# Patient Record
(demographics unavailable — no encounter records)

---

## 2017-09-28 NOTE — XMS
Clinical Summary

 Created on:2017



Patient:Obie Tomas

Sex:Male

:1936

External Reference #:[id number]





Demographics







 Address  [address] [place] [address]

 

 Home Phone  [phone]

 

 Work Phone  [phone]

 

 Preferred Language  Unknown

 

 Marital Status  

 

 Religion Affiliation  Unknown

 

 Race  White

 

 Ethnic Group  Not  or 









Author







 Organization  [facility]

 

 Address  [address] [place] [address]

 

 Phone  [phone]









Care Team Providers







 Name  Role  Phone

 

 ,  Primary Care Provider  Unavailable









Allergies

Not on File



Current Medications

Not on file



Active Problems

Not on file



Social History







 Tobacco Use  Types  Packs/Day  Years Used  Date

 

 Never Assessed        









 Sex Assigned at Birth  Date Recorded

 

 Not on file  







Last Filed Vital Signs

Not on file



Plan of Treatment

Not on file



Results

Not on filefrom Last 3 Months

## 2017-09-29 NOTE — PRG
DATE OF SERVICE:  09/29/2017

 

SUBJECTIVE:  Mr. Tomas feels better.

 

OBJECTIVE:

VITAL SIGNS:  He is afebrile, heart rate in the 70s, respiratory rate 12, oximetry is 95 on 2 liters
, blood pressure 119/63.  Intake and output is negative 1370.

LUNGS:  Improved.

CARDIOVASCULAR:  Heart regular rhythm.

ABDOMEN:  Soft.

 

IMPRESSION:

1.  Congestive heart failure.  Dr. Calle is assisting in his management.

2.  Underlying obstructive lung disease, it is stable.

3.  Status post bilobectomy on the right for lung cancer in the past.

4.  Deconditioning after a fall with a pelvis fracture.

 

PLAN:  Continue current care.

## 2017-09-29 NOTE — HP
DATE OF ADMISSION:  09/28/2017

 

HISTORY OF PRESENT ILLNESS:  Obie Tomas is a very pleasant gentleman who moved here at the end of 
2016.  I saw him in November when he came to me seeking a pulmonary physician.

 

He has a history of sleep apnea and old records were reviewed suggested that he is marginally compli
ant with CPAP.  He has a COPD.

 

He has had a right upper lobe and right middle lobectomy for an apical nodule that was malignant yea
rs ago.  He has a history of a lipid disorder, reflux disease, hypertension, coronary artery disease
, and BPH.

 

He recently fell and had multiple fractures including a pelvis fracture and required hospitilization
, rehab and then hospitalization with pneumonia and then again rehab.  He has only recently been vasyl
e.  He saw Dr. Calle recently, who adjusted medications.  He has a history of atrial fibrillation f
or which he takes Lopressor and Cardizem.

 

His heart rate lately Mr. Tomas says it has been running in the 1-teens at home using his oximeter
 to count his pulse.

 

He presented today to my office with complaints of shortness of breath and hypoxemia at home.

 

Subsequently, he has been admitted because of pulmonary edema on his radiograph and for rate control
.

 

PAST MEDICAL HISTORY:  Otherwise mentioned only above, he has had 2 pneumonias and cervical spine pr
oblems prior to moving up here.

 

He is a 3-pack a day smoker for many years.

 

It is my impression that since he already had two lobes resected, he must not have had terrible COPD
 on pulmonary function testing.

 

MEDICATIONS:  Prior to admission by his report the same as discharged from the hospital, which inclu
ded Eliquis, nebulizer, Flomax, Protonix, MiraLax, Singulair, Lopressor, Cardizem, lisinopril, and D
emadex.

 

FAMILY HISTORY:  Negative for lung disease in early age.

 

SOCIAL HISTORY:  He is formerly self employed.  If I recall correctly, he was involved in the oil an
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

 

REVIEW OF SYSTEMS:  Otherwise negative.  He denies fever, says he has been quite short of breath and
 has been hypoxic in spite of having oxygen at the house down in to the 70s.

 

PHYSICAL EXAMINATION:  In the office:

VITAL SIGNS:  Heart rate is 115, irregular, blood pressure is 138/86, respiratory rate was 18, oxime
try is 94 on 2 liters per minute, which is decreased from being 96 on room air last time he is in 
e office.

HEENT:  Pupils are equal.  Sclerae are anicteric.

NECK:  Supple.

LUNGS:  Remarkable for crackles at both lung bases.

HEART:  Irregular.

ABDOMEN:  Soft.

EXTREMITIES:  Without asymmetry.  He has 2+ pitting edema half way up to his knees.

 

IMAGING:  Dr. Calle tells me that his echocardiogram recently showed an ejection fraction of 25%.

 

IMPRESSION AND PLAN:

1.  Left ventricular systolic dysfunction with acute congestive heart failure.

2.  Presumed atrial fibrillation with rapid ventricular response.

3.  Underlying obstructive lung disease.

4.  Status post right upper and right middle lobectomy for a history of malignant nodule.

5.  Deconditioning.  We will get physical therapy involved.  We have admitted him for diuresis, rate
 control, adjustment of his p.o. medicines.  He will stay on deep venous thrombosis prophylaxis with
 Eliquis.  He will continue with Flomax for his BPH, Protonix for gastrointestinal prophylaxis, Pat
Lax for daily bowel movements, Singulair for component of asthma, we believe, he will stay on his 25
 of Lopressor twice a day, and lisinopril 20 in the morning for hypertension.  He has a history of e
levated liver enzymes last admission.  We will recheck these as well as routine labs.  Dr. Calle wi
ll see him as well.

## 2017-09-29 NOTE — CON
DATE OF CONSULTATION:  09/28/2017

 

REASON FOR CONSULTATION:  Congestive heart failure, systolic; acute on chronic.

 

HISTORY OF PRESENT ILLNESS:  Mr. Tomas is a pleasant 81-year-old gentleman.  The patient has a jeanette
g history of congestive heart failure, has been hospitalized on multiple occasions with this prior t
o moving to this [place].  Patient states that he has been having progressive lower extremity edema and
 worse swelling, is becoming refractory to his medications, saw Dr. Cevallos in the office today and Dr Ольга Cevallos admitted to the hospital for congestive heart failure, decompensated.

 

Patient has not had chest pain.

 

PAST MEDICAL HISTORY:

1.  The patient has a history of atrial fibrillation, chronic.

2.  Congestive heart failure, systolic and chronic.

3.  He has a history of coronary artery disease, it was thought to be best treated medically.

4.  Other past history is a history of lung cancer, apex of his lung in 1996.

5.  Aortic aneurysm of thoracic aorta in 1988.

 

MEDICATIONS PRIOR TO ADMISSION:

1.  Aspirin 81 mg a day.

2.  Eliquis 5 mg twice a day.

3.  Meclizine.

4.  Lisinopril 20 mg a day.

5.  Metoprolol 50 mg twice a day.

6.  Diltiazem long-acting 180 mg a day.

7.  Torsemide 100 mg Monday, Wednesday, Friday and half pill the other days.

 

ALLERGIES:  None known.

 

SOCIAL HISTORY:  No alcohol or tobacco.

 

REVIEW OF SYSTEMS:  Constitutional:  Positive for weakness, fatigue.  Vision:  No changes.  Hearing:
  No changes.  Pulmonary:  No wheezing.  Gastrointestinal:  No nausea, vomiting, diarrhea.  Skin:  N
o rashes.  Neurologic:  No unilateral weakness or numbness.  Psychiatric:  No unusual depression or 
anxiety.  Hematologic:  No unusual bruising.  Genitourinary:  He has difficulty urinating (voiding.)

 

PHYSICAL EXAMINATION:

GENERAL:  This is a pleasant elderly gentleman resting comfortably, in no distress.

VITAL SIGNS:  His pulse is in the 80-90 range it is irregular.  Blood pressure 141/66.

EYES:  Sclerae nonicteric.

MOUTH:  Mucous membranes moist.

NECK:  Supple, no lymphadenopathy.

LUNGS:  Clear anteriorly and laterally.

CARDIAC:  Irregularly irregular.  No murmur, rub or gallop.

ABDOMEN:  Soft, nontender.

EXTREMITIES:  No clubbing or cyanosis.  There is moderate-to-severe edema.

SKIN:  Warm and dry.

 

Most recent echocardiogram shows ejection fraction 25-30%, dilated left ventricle, severely dilated 
left atrium.

 

ASSESSMENT:

1.  Congestive heart failure, systolic, acute on chronic, decompensated.

2.  Atrial fibrillation, chronic.

3.  Coronary artery disease, stable.

4.  Prostatism.

 

PLAN:

1.  Change from the current regimen to carvedilol.

2.  If needed add digoxin to help with heart rate control, diltiazem likely counterproductive in the
 situation.

 

I will be glad to follow with you.

## 2017-09-29 NOTE — PRG
DATE OF SERVICE:  09/29/2017

 

Mr. Tomas feels MUCH, MUCH better.

 

The patient had an excellent response to Lasix last night.  When asked if he put out a lot of urine 
and he said, \\"OH YEH\\".  The output recorded last night is relatively modest, it seems that the o
utputs were not accurate.

 

He says he feels tremendously better.

 

PHYSICAL EXAMINATION: 

VITAL SIGNS:  His blood pressure is 119/63, pulse is 100, it is irregularly irregular.

LUNGS:  Clear anteriorly and laterally.

CARDIAC:  Irregular, irregular.

ABDOMEN:  Soft, nontender.

EXTREMITIES:  Only mild to moderate edema.

 

ASSESSMENT:

1.  Congestive heart failure, systolic, acute on chronic, improved.

2.  Renal failure stage 3, creatinine 1.35.

3.  Mild anemia, hemoglobin 10.9.

4.  Chronic atrial fibrillation.

 

PLAN:

1.  We will decrease lisinopril dose this morning to try to encourage diuresis.

2.  Increase carvedilol.

3.  He has been taken off the diltiazem.  If needed for rate control will add digoxin.  Will try to 
mostly control the rate with carvedilol.

## 2017-09-30 NOTE — PRG
DATE OF SERVICE:  09/30/2017

 

SUBJECTIVE:  The patient is doing better.  He had no acute complaints today.

 

PHYSICAL EXAMINATION:

VITAL SIGNS:  His temperature is 98.1, pulse 116, blood pressure 132/81, O2 sat 95% on 2 liters.

HEENT:  Unremarkable.

NECK:  No JVD.

LUNGS:  He has a few crackles in both bases.

CARDIAC:  S1 and S2 regular.

ABDOMEN:  Soft.

EXTREMITIES:  Trace edema in the legs.

 

LABORATORY DATA:  White blood cell count 9.5, hematocrit 37.6, platelet count 168.  Sodium 141, pota
ssium 3.7, chloride 103, CO2 32, BUN 23, creatinine 1.3, glucose 82.

 

ASSESSMENT:

1.  Congestive heart failure.

2.  Chronic obstructive pulmonary disease.

3.  Status post bilateral lobectomies for lung cancer.

4.  History of fall.

 

PLAN:  I think it would be best to go ahead and transition him over to oral diuretics and see if he 
does okay.  If he does, then he may be able to go home as early as tomorrow if okay with the cardiol
ogist.

## 2017-09-30 NOTE — PDOC.CTH
Cardiology Progress Note





- Subjective





No new issues. Feels much better since admission. Tolerating meds. No CV 

complaints. ROS otherwise negative.





- Objective


 Vital Signs











  Temp Pulse Resp BP BP BP Pulse Ox


 


 09/30/17 17:35     125/76   


 


 09/30/17 15:58  97.7 F  99  16   125/76   97


 


 09/30/17 14:52   88  16    


 


 09/30/17 12:00  97.6 F  95  18   138/77   93 L


 


 09/30/17 11:14   103 H  12    


 


 09/30/17 08:39     132/81   


 


 09/30/17 08:38     132/81   


 


 09/30/17 08:12        95


 


 09/30/17 08:10   116 H  12    


 


 09/30/17 08:00  98.1 F  99  18    132/81  94 L








 











Weight                         215 lb 9.6 oz














 











 09/29/17 09/30/17 10/01/17





 06:59 06:59 06:59


 


Intake Total 360 1160 


 


Output Total 1730 2625 


 


Balance -1370 -1465 














- Physical Examination


General/Neuro: alert & oriented x3, NAD


Neck: carotid US brisk, no JVD present


Lungs: CTA, unlabored respirations


Heart: other: (irregular)


Abdomen: no HSM, NT/ND, soft


Extremities: other: (2+ pulses, minimal edema)


Other PE findings: Neuro: no focal motor defs





- Telemetry


Telemetry Rhythm: AF, rates controlled.





- Labs


Result Diagrams: 


 09/30/17 05:17





 09/30/17 05:19





- Assessment/Plan





1. acute/chronic combined CHF: improved. Continue current medical therapy. 

Anticipate home soon.





2. CKD, III: monitor. Appears stable currently.

## 2017-10-01 NOTE — DIS
DATE OF ADMISSION:  09/28/2017

 

DATE OF DISCHARGE:  10/01/2017

 

DISCHARGE DIAGNOSES:

1.  Congestive heart failure, acute on chronic systolic, decompensated.

2.  Atrial fibrillation, chronic.

3.  Coronary artery disease.

4.  Hypoxemia.

 

DISPOSITION:  The patient is going home.

 

DISCHARGE MEDICATIONS:  The patient's home medications will be continued with the exception of disco
ntinuing the metoprolol and Cardizem and starting carvedilol 12.5 mg twice daily.

 

SUMMARY OF HOSPITALIZATION:  The patient was brought in on 09/28/2017 with decompensated heart failu
re.  He was seen in consultation by his cardiologist, Dr. Calle.  His metoprolol and Cardizem were 
stopped, and he was started on Coreg 12.5 mg twice daily.  He was diuresed with Lasix and improved s
ignificantly by the day of discharge.  He will follow up with Dr. Calle and Dr. Cevallos in 2 weeks, WENCESLAO Allen in about a week.

## 2018-01-18 NOTE — RAD
PORTABLE AP CHEST X-RAY

1/18/18

 

HISTORY: 

Shortness of breath for two days. No chest pain. Dyspnea. 

 

COMPARISON:  

7/17/17

 

FINDINGS:  

Cardiac silhouette is magnified by projection but does appear borderline enlarged. There are postsurg
ical changes again seen involving the region of the aortic arch and descending thoracic aorta with pr
obably a stent within the thoracic aorta. Surgical clips are also again seen in the right hilar regio
n. There are chronic lung changes again seen bilaterally. There is persistent slight blunting of the 
right lateral costophrenic angle with linear densities at the right lung base similar to prior exam, 
again likely related to chronic lung changes. No new focal area of consolidation or pleural fluid is 
seen. Remote right sided rib fractures are present. Admire screws overlie the right humeral head. No 
other interval change.

 

IMPRESSION:  

1.      Stable postsurgical changes of the chest as described above in addition to chronic lung brown
es. 

2.      No acute cardiopulmonary process. 

 

POS: [facility]

## 2018-01-23 NOTE — RAD
TWO VIEWS CHEST

1/23/18

 

PROVIDED CLINICAL HISTORY:  

Chest pain. 

 

FINDINGS:  

Comparison 1/18/18. 

 

The cardiac silhouette remains enlarged. Vascular calcification is noted involving the aortic arch. C
hronic obstructive changes are demonstrated. Pleural and/or parenchymal opacity at the right lung bas
e appears similar to the prior study. No evidence for pneumothorax.

 

IMPRESSION:  

Pleural and/or parenchymal opacity of the right lung base may reflect pleural fluid and adjacent scar
ring or infiltrate. Followup is recommended. 

 

POS: [facility]

## 2018-01-24 NOTE — PQF
CLINICAL DOCUMENTATION IMPROVEMENT CLARIFICATION FORM:  ICD-10 Updated



PLEASE DO AN ADDENDUM TO THE PROGRESS NOTE WITH ANY DOCUMENTATION UPDATES OR 
ADDITIONS AND CARRY THROUGH TO DC SUMMARY.   THANK YOU.



DATE:      1/24/18                                            ATTN:  DR. ROMERO





Please exercise your independent, professional judgment in responding to the 
clarification form. 

Clinical indicators are provided on the bottom of this form for your review





Please check appropriate box(s) to clarify if the following diagnosis has been 
ruled in our ruled out:  PNEUMONIA



[ x ] Ruled in diagnosis (Right Lower Lobe PNA

     [  ] Continue to treat        [  ] Resolved

[  ] Ruled out diagnosis

[  ] Cannot rule out diagnosis

[  ] Other diagnosis ___________

[  ] Unable to determine



In addition, please specify:

Present on Admission (POA):  [ x ] Yes             [  ] No             [  ] 
Unable to determine



For continuity of documentation, please document condition throughout progress 
notes and discharge summary.  Thank You.



CLINICAL INDICATORS - SIGNS / SYMPTOMS / LABS



H&P: "POSSIBLE RIGHT LOWER LOBE PNEUMONIA"



CHEST XRAY: "PLEURAL AND/OR PARENCHYMAL OPACITY OF THE RIGHT LUNG BASE MAY 
REFLECT PLEURAL FLUID AND ADJACENT SCARRING OR INFILTRATE."



RISKS:

H/O COPD

H/O LUNG CANCER W/ LOBECTOMY



TREATMENT:

IV SOLUMEDROL (GIVEN IN ER)

DUONEBS (GIVEN IN ER)

GUAIFENASIN 

LEVAQUIN 



(This form is maintained as a part of the permanent medical record)

 2015 [employer].  All Rights Reserved

[email or web address]    Office:  [phone]

                                                              

[facility]

## 2018-01-24 NOTE — PDOC.EVN
Event Note





- Event Note


Event Note: 





81 M admitted earlier today for Acute CHF. He also has a h/o COPD and on 

supplemental O2 at home (2.5 liters). 


Reports doing better since commencing diuresis. 


Physical exam reveals minimal lower extremity edema b/l. 


Plan


Continue diuresis, monitor creatinine closely and f/u with cardiology 

recommendations.

## 2018-01-24 NOTE — PRG
DATE OF SERVICE:  01/24/2018

 

Mr. Tomas is feeling just a little better, but not a lot better.  He has not diuresed much with the
 intravenous furosemide 40 mg.

 

He is not having chest pain.  He feels like he is still "gurgling" and short of breath.

 

PHYSICAL EXAMINATION:

VITAL SIGNS:  Blood pressure 164/88, pulse  irregular, atrial fibrillation.

LUNGS:  Basilar rales and rhonchi.

CARDIAC:  Irregular, irregular.

ABDOMEN:  Soft, nontender.

EXTREMITIES:  No edema.

 

ASSESSMENT:

1.  Congestive heart failure, systolic and diastolic mixed, somewhat becoming refractory.

2.  Chronic atrial fibrillation.

3.  Stage 3 renal failure.

4.  BNP elevated at 1001.

 

PLAN:

1.  Increase furosemide.  

2.  Dose of metolazone.

3.  I will continue to follow with you.  Prognosis guarded long-term.

## 2018-01-24 NOTE — PQF
CLINICAL DOCUMENTATION IMPROVEMENT CLARIFICATION FORM:  ICD-10 Updated



PLEASE DO AN ADDENDUM TO THE PROGRESS NOTE WITH ANY DOCUMENTATION UPDATES OR 
ADDITIONS AND CARRY THROUGH TO DC SUMMARY.   THANK YOU.



DATE:         1/24/18                                             ATTN:  DR. ROMERO



Please exercise your independent, professional judgment in responding to the 
clarification form. 

Clinical indicators are provided on the bottom of this form for your review



Please check appropriate box(s):

[  ] Acute Respiratory Failure:                     [  ] with Hypoxia[  ] with 
Hypercapnia

[ x ] Acute On Chronic Respiratory Failure:  [ x ] with Hypoxia [  ] with 
Hypercapnia

[  ] Acute Respiratory Failure due to: (etiology) ______________________ 

[  ] Chronic Respiratory Failure only        [  ] with Hypoxia       [  ] with 
Hypercapnia



[  ] Other diagnosis ___________

[  ] Unable to determine



In addition, please specify:

Present on Admission (POA):  [  x] Yes             [  ] No             [  ] 
Unable to determine



For continuity of documentation, please document condition throughout progress 
notes and discharge summary.  Thank You.





CLINICAL INDICATORS - SIGNS / SYMPTOMS / LABS



ER NOTE: "PT IS NORMALLY ON 2L AT HOME, BUT HAD TO INCREASE O2 BECAUSE OF 
SHORTNESS OF BREATH."



RISKS:

H/O COPD

C/O SLEEP APNEA

LUNG CANCER W/ LOBECTOMY



TREATMENT:

SUPPLEMENTAL OXYGEN

ATROVENT INHALER

JOHN ANTONIO



(This form is maintained as a part of the permanent medical record)

 2015 [employer].  All Rights Reserved

[email or web address]    Office:  [phone]

                                                              

[facility]

## 2018-01-24 NOTE — HP
PRIMARY CARE PHYSICIAN:  Dr. Allen 

 

PRIMARY PULMONOLOGIST:  Dr. Cevallos

 

PRIMARY CARDIOLOGIST:  Dr. Kal Calle

 

CHIEF COMPLAINT:  Shortness of breath.

 

HISTORY OF PRESENT ILLNESS:  Mr. Tomas is a pleasant 81-year-old male with history of sleep apnea, 
paroxysmal atrial fibrillation, COPD, lung cancer, status post right upper and right middle lobe lobe
ctomies in 1986, hyperlipidemia, GERD, hypertension, coronary artery disease, and BPH.

 

The patient had been having some shortness of breath off and on for the last 3 weeks with dyspnea on 
exertion and some orthopnea.  He is normally on 2 liters nasal cannula all the time.

 

The patient went to Dr. Calle's office on 1/23/2018 for evaluation.  He was noted to have chest pain
 and shortness of breath and hypoxia and was sent straight to the ER.

 

In the ER, he received Solu-Medrol, Lasix, aspirin, DuoNebs.  Labs showed a normal CBC, chemistries w
ere normal, but the patient was requiring 4 liters to maintain 96% saturations.  We were subsequently
 called for admission.

 

The patient was accepted late on 1/23/2018, time of visit was 0330 on 01/24/2018.  The patient was se
en and examined in the room after transfer to the floor.

 

The patient states his breathing was a little better.  No fevers or chills, though he was drenched in
 sweat.  No nausea, vomiting, diarrhea, constipation.

 

PAST MEDICAL HISTORY:

1.  Obstructive sleep apnea, the patient is followed by Dr. Cevallos.  Per his last note, he thinks he i
s minimally adherent to a CPAP regimen.

2.  Proximal atrial fibrillation.

3.  Chronic obstructive pulmonary disease on inhalers.

4.  Lung cancer, status post right upper lobe and right middle lobe lobectomies in 1986.

5.  Hyperlipidemia on atorvastatin.

6.  Gastroesophageal reflux disease.

7.  Hypertension.  

8.  BPH.

9.  Coronary artery disease.

 

PAST SURGICAL HISTORY:

1.  Right upper lobe, right middle lobe lobectomies in 1986 for lung cancer.

2.  Right inguinal hernia repair.

3.  Achilles repair on the left.

4.  Back surgery x2.

5.  Umbilical hernia repair.

6.  Hemorrhoidectomy.

 

MEDICATIONS:

1.  Milk of Magnesia 30 mL p.o. at bedtime.  

2.  Atorvastatin 80 mg p.o. at bedtime.

3.  Aspirin 81 mg daily.

4.  Eliquis 2.5 mg p.o. b.i.d.,

5.  Allopurinol 100 mg p.o. daily.

6.  Albuterol nebs q.4h. p.r.n.

7.  Singulair 10 mg p.o. at bedtime.

8.  Meclizine 25 mg p.o. t.i.d. p.r.n. vertigo.

9.  Ipratropium 0.06% two drops in both nares b.i.d.

10.  Flonase 1 puff by both nostrils b.i.d. 

11.  Coreg 12.5 mg p.o. b.i.d.

12.  Sulfapyridine 100 mg p.o. as needed for dysuria.

13.  Spiriva 18 mcg inhaled daily.

14.  Flomax 0.4 mg p.o. b.i.d.

15.  KCl 20 mEq p.o. q.a.m.

16.  Torsemide 100 mg p.o. daily.

 

ALLERGIES:  NKDA.

 

FAMILY HISTORY:  Significant for mom with CHF.

 

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  He quit in 1998.  No alcohol or
 IV drug abuse.

 

REVIEW OF SYSTEMS:  A 10-point review of systems was performed, negative for all systems except statu
s post HPI.

 

PHYSICAL EXAMINATION:

VITAL SIGNS:  Temperature on arrival to the ER was 98.4, pulse 106, blood pressure 174/72, respirator
y rate 22, satting 96% on 4 liters.  Vitals on arrival to the floor shows a temperature 97.7, pulse 9
4, blood pressure 141/97, respiratory 20, satting 96% on 2-1/2 liters.

GENERAL:  He is sleeping soundly, but easily arousable.  He is able to lay flat on his side.  He is i
n no acute distress.  He is in no respiratory distress.

HEENT:  Normocephalic and atraumatic, his pupils are equal, round, react to light bilaterally, mucous
 membranes are moist.  No visible lesions or thrush.

NECK:  Supple, without lymphadenopathy, JVD or thyromegaly, normal carotid upstrokes.

LUNGS:  Lungs have coarse rales bilaterally.  He has a slightly prolonged expiratory phase.  I do not
 appreciate any wheezing at present.  No rhonchi.

CARDIOVASCULAR:  He is slightly tachycardic in the 90s and regular.  Normal S1, S2.  No S3, S4.  I do
 not hear murmurs.

ABDOMEN:  Soft, is nontender, nondistended with good bowel sounds.  He has no rebound, rigidity or gu
arding.

EXTREMITIES:  No cyanosis or clubbing.  He has got 2+ pitting edema in the bilateral lower extremitie
s.

SKIN:  Warm, moist, well-perfused without any rashes or lesions.

MUSCULOSKELETAL:  Normal to inspection.  There is no joint inflammation and no palpable effusions.

NEUROLOGIC:  Cranial nerves II-XII grossly intact, he has 5/5 strength in all 4 extremities.  There a
re no focal deficits and a normal speech pattern.

 

LABORATORY DATA:  Sodium 139, potassium 3.9, chloride 90, bicarb 31, BUN 31, creatinine 1.4, glucose 
162 and calcium 10.7.

 

Liver functions are normal.  Total bilirubin slightly elevated at 1.5.  CBC showed a white count of 7
.9, hemoglobin 15.1, hematocrit 49.6 and platelet count 181,000.  He has 89% granulocytes, but no ban
ds are present.

 

RADIOGRAPHS:  He had a chest x-ray done on 01/23/2018 that shows a pleural and/or parenchymal opacity
 of the right lung base that could reflect fluid or adjacent scarring or infiltrate.  Recommended a f
ollowup.

 

ASSESSMENT AND PLAN:

1.  Possible right lower lobe pneumonia.  There is some obscuration of the diaphragm.  The patient ha
s acute hypoxic respiratory failure.  He does have a history of heart disease and does have a history
 of chronic obstructive pulmonary disease.  At this point, he is not audibly wheezing to me.  He has 
no overt signs of a chronic obstructive pulmonary disease exacerbation.  He did receive Solu-Medrol 1
25 in the emergency department and some nebs. We will continue his DuoNebs q.4h. and albuterol q.2h. 
p.r.n. nebulized as well.  BNP was elevated at 1001.4.  So this could represent acute exacerbation of
 congestive heart failure.  Last echocardiogram here was on 11/06/2016 read by Dr. Narayan and shows m
oderately reduced systolic function with ejection fraction between 40 and 50%, inability to assess di
astolic heart dysfunction due to his atrial fibrillation, at the time, valve sclerosis without signif
icant stenosis or regurgitation.  Mild MR due to mitral annular calcification and mild TR with inadeq
uate TR envelope to estimate RVSP.  The patient did get Lasix in the emergency department 20 mg. We w
ill continue IV Lasix here 40 mg q.6 hours for 4 doses and monitor him closely.  Repeat chest x-ray i
n the morning today and tomorrow.

2.  History of lung cancer, status post right upper lobe and right middle lobectomy, should make  him
 more susceptible to respiratory changes.

3.  Hyperlipidemia, on atorvastatin.  We will continue.

4.  Gastroesophageal reflux disease.  Place him on Pepcid for prophylaxis.

5.  Hypertension, on Coreg, which we will continue.

6.  Coronary artery disease, asymptomatic at present.

7.  Paroxysmal atrial fibrillation on stroke prophylaxis with aspirin and Eliquis.  We will continue 
these.  We will ask Dr. Calle to see.  Continue home medications.  We will follow up on labs.

## 2018-01-25 NOTE — CON
DATE OF CONSULTATION:  01/25/2018

 

Mr. Tomas is a very pleasant 81-year-old.

 

He was admitted on 01/24/2018 with complaints of shortness of breath.

 

He has underlying obstructive lung disease, history of atrial fibrillation, 
history of sleep apnea, history of resection of lung cancer.

 

He has been diuresed, but remains short of breath and bronchospastic, so I was 
consulted.

 

PAST MEDICAL HISTORY:

1.  Remarkable for sleep apnea.

2.  Chronic obstructive pulmonary disease.

3.  Atrial fibrillation.

4.  History of a right middle and right upper lobe resection.

5.  Lipid disorder.

6.  History of hypertension.

7.  Benign prostatic hypertrophy.

8.  History of an inguinal herniorrhaphy.

9.  History of an Achilles tendon repair.

10.  History of back surgery.

11.  History of deconditioning.

12.  History of umbilical herniorrhaphy.

13.  History of proximal humerus fracture in 06/2017.

14.  History of a nondisplaced anterior acetabular fracture and a high superior 
ramus fracture on the right after a fall.  This was treated nonsurgically.

15.  History of re-admission for cough and an abnormal chest x-ray, as well as 
hypotension.  A lot of this was felt to be intravascular volume depletion, but 
he also was felt to have a pneumonia at the same time.

 

SOCIAL HISTORY:  He quit smoking approximately 30 years ago.  He is not a daily 
drinker.  Does not use drugs.

 

FAMILY HISTORY:  Negative for lung disease at an early age.

 

ALLERGIES:  He has no reported drug allergies.

 

REVIEW OF SYSTEMS:  His only complaint is shortness of breath at rest, which is 
mild.  He denies hemoptysis, chest pain, or purulent sputum.  He has had no 
pleurisy with this.  He denies palpitations. 10 point system reviewed.

 

PHYSICAL EXAMINATION:

GENERAL:  He is very pleasant and cooperative.

VITAL SIGNS:  He is afebrile.  Heart rates in the 50s up to 108 today, 
respiratory rate 15, oximetry is 97 on 3 liters.

HEENT:  Pupils are equal.  Sclerae are anicteric.

NECK:  Supple.

LUNGS:  Remarkable for diffuse wheezes.

HEART:  Regular rhythm.

ABDOMEN:  Soft.

EXTREMITIES:  Without asymmetry.

 

Chest radiograph was repeated and reviewed films and reports.  He has scarring 
at his right base.  There are no alveolar infiltrates.

 

IMPRESSION:

1.  Chronic obstructive pulmonary disease exacerbation.

2.  Recent volume overload that has been diuresed.  He has developed little bit 
of an alkalosis with this.

 

PLAN:  Continue nebulized treatments every 4 hours while he is awake, ( Duoneb)

 

We will give him a dose of IV magnesium.  I agree with his antimicrobial 
therapy.

 

He would probably benefit from IV steroids.  I will be happy to follow with the 
other physicians caring for him.  It is a 50-minute consult, greater than 50% 
of the time was spent on the unit coordinating care with the care providers.

 

CHARLES

## 2018-01-25 NOTE — PRG
DATE OF SERVICE:  01/25/2018

 

REASON FOR STUDY:  Mr. Tomas states that he does feel weak and washed out.  The urine output is onl
y moderate as will be outlined below.  He still has a cough.  He says the inhaled nebulizer seems to 
be helping him more than anything.

 

PHYSICAL EXAMINATION:

VITAL SIGNS:  Blood pressure 145/104.  The most recent recorded pulse is 52, before that was 96.

LUNGS:  Some expiratory wheezing and a few rhonchi.

CARDIAC:  Irregular.

ABDOMEN:  Soft, nontender.

EXTREMITIES:  There is only mild edema.

 

LABORATORY DATA:  WBC was 13.5, creatinine stable 1.34, potassium 3.7, troponin levels peaked 0.053.

 

ASSESSMENT:

1.  Congestive heart failure, systolic and diastolic combined.

2.  Chronic obstructive pulmonary disease.

3.  Atrial fibrillation, chronic.

 

PLAN:

1.  We will reduce furosemide to 40 mg IV every 12 hours.

2.  Ask Dr. Cevallos to see him concerning his pulmonary status.

3.  Since he is already on apixaban, does not need to be on heparin as well.

## 2018-01-25 NOTE — PDOC.PN
- Subjective


Encounter Start Date: 01/25/18


Encounter Start Time: 13:13


Subjective: c/o constipation but otherwise feels fine. NO acute overngiht 

events. 





- Objective


Resuscitation Status: 


 











Resuscitation Status           FULL:Full Resuscitation














MAR Reviewed: Yes


Vital Signs & Weight: 


 Vital Signs (12 hours)











  Temp Pulse Resp BP Pulse Ox


 


 01/25/18 12:00   52 L  18  145/104 H  97


 


 01/25/18 11:08   96  16  


 


 01/25/18 09:50  96.5 F L  94  18  135/62  94 L


 


 01/25/18 08:15  96.5 F L  94  18  


 


 01/25/18 07:58   107 H  16  


 


 01/25/18 04:00  98.2 F  99  20  137/74  97


 


 01/25/18 02:36      96








 Weight











Weight                         202 lb














I&O: 


 











 01/24/18 01/25/18 01/26/18





 06:59 06:59 06:59


 


Intake Total 260 2230 300


 


Output Total 660 2830 


 


Balance -400 -600 300











Result Diagrams: 


 01/25/18 04:06





 01/25/18 04:06





Phys Exam





- Physical Examination


Constitutional: NAD


HEENT: PERRLA, moist MMs, sclera anicteric


Neck: supple, full ROM


Decreased b/s lower lung bases. Minimal wheezing. 


Cardiovascular: RRR, no significant murmur, no rub


Gastrointestinal: soft, non-tender, no distention, positive bowel sounds


Musculoskeletal: no edema


Neurological: non-focal, moves all 4 limbs


Psychiatric: normal affect, A&O x 3


Skin: no rash





Dx/Plan


(1) Pneumonia


Code(s): J18.9 - PNEUMONIA, UNSPECIFIED ORGANISM   Status: Acute   


Qualifiers: 


   Pneumonia type: due to unspecified organism   Laterality: right   Lung 

location: lower lobe of lung   Qualified Code(s): J18.1 - Lobar pneumonia, 

unspecified organism   


Plan: 


Continue current antibiotic. 





Comment: Improving with Levofloxacin   





(2) Acute exacerbation of CHF (congestive heart failure)


Code(s): I50.9 - HEART FAILURE, UNSPECIFIED   Status: Acute   


Qualifiers: 


   Congestive heart failure type: systolic   Qualified Code(s): I50.23 - Acute 

on chronic systolic (congestive) heart failure   


Plan: 


Continue furosemide, carvedilol. Monitor daioly weights and I/O. pt diuresing 

well on current dosage. 





Comment: Last EF 45-50%. On 80 mg IV furosemide.


Cards on board, recs appreciated.    





(3) HTN (hypertension)


Code(s): I10 - ESSENTIAL (PRIMARY) HYPERTENSION   Status: Chronic   


Qualifiers: 


   Hypertension type: essential hypertension   Qualified Code(s): I10 - 

Essential (primary) hypertension   


Plan: 


Continue current therapy. 





Comment: Stable currently, continue home BP regimen.   





(4) COPD (chronic obstructive pulmonary disease)


Status: Chronic   


Qualifiers: 


   COPD type: unspecified COPD   Qualified Code(s): J44.9 - Chronic obstructive 

pulmonary disease, unspecified   


Plan: 


Continue current management. 





Comment: Not in acute exacerbation. On O2 (uses 2.5 L at home), nebs, resp 

therapy. 


   





(5) MARIUM (acute kidney injury)


Code(s): N17.9 - ACUTE KIDNEY FAILURE, UNSPECIFIED   Status: Acute   


Plan: 


Likely cardiorenal. Improving with diuresis. 





Comment: Likely cardiorenal. Improving   





(6) CKD (chronic kidney disease) stage 3, GFR 30-59 ml/min


Code(s): N18.3 - CHRONIC KIDNEY DISEASE, STAGE 3 (MODERATE)   Status: Chronic   

Comment: See under MARIUM.


   





- Plan


cont current plan of care, continue antibiotics, DVT proph w/heparin





* .

## 2018-01-25 NOTE — RAD
AP VIEW OF THE CHEST:

 

INDICATION: 

Pneumonia.

 

COMPARISON: 

Prior exam dated 1/18/18.

 

FINDINGS: 

Since the comparison examination, there is worsening airspace opacity in the right lower lobe suspici
ous for worsening pneumonia and development of a small right pleural effusion.  There is cardiomegaly
.  There is postsurgical change involving the mediastinum that appears similar.  No acute osseous abn
ormality is evident.  There is healed deformity involving the proximal right humerus with rotator cuf
f repair.

 

IMPRESSION: 

1.  Worsening airspace opacity and right-sided pleural effusion suspicious for pneumonia with peripne
umonic effusion.

2.  Stable cardiomegaly and postsurgical change to the mediastinum.

3.  Stable chronic osseous changes.

 

POS: MARY

## 2018-01-26 NOTE — PDOC.PN
- Subjective


Encounter Start Date: 01/26/18


Encounter Start Time: 11:25


Subjective: No complainta. today. Feels very well. No acute overnight events. 





- Objective


Resuscitation Status: 


 











Resuscitation Status           FULL:Full Resuscitation














MAR Reviewed: Yes


Vital Signs & Weight: 


 Vital Signs (12 hours)











  Temp Pulse Resp BP BP Pulse Ox


 


 01/26/18 08:00  96.9 F L  58 L  18    98


 


 01/26/18 07:52       98


 


 01/26/18 07:49   84  16   


 


 01/26/18 07:31  96.9 F L  58 L  18   151/88 H  98


 


 01/26/18 04:00  97.4 F L  84  20  144/65 H   95


 


 01/26/18 02:02   102 H  16    93 L








 Weight











Weight                         194 lb 4.8 oz














I&O: 


 











 01/25/18 01/26/18 01/27/18





 06:59 06:59 06:59


 


Intake Total 2230 930 


 


Output Total 2830 950 


 


Balance -600 -20 











Result Diagrams: 


 01/26/18 10:07





 01/26/18 10:07





Phys Exam





- Physical Examination


Constitutional: NAD


HEENT: PERRLA, moist MMs, sclera anicteric


Neck: supple, full ROM


Respiratory: no wheezing, no rales, no rhonchi, clear to auscultation bilateral


Cardiovascular: RRR, no significant murmur, no rub


Gastrointestinal: soft, non-tender, no distention, positive bowel sounds


Musculoskeletal: no edema, pulses present


Neurological: non-focal, moves all 4 limbs


Skin: no rash, normal turgor





Dx/Plan


(1) Acute exacerbation of CHF (congestive heart failure)


Code(s): I50.9 - HEART FAILURE, UNSPECIFIED   Status: Acute   


Qualifiers: 


   Congestive heart failure type: systolic   Qualified Code(s): I50.23 - Acute 

on chronic systolic (congestive) heart failure   


Plan: 


Euvolemic on examination. 


Discontinue IV furosemide


Resume home torsemide


Monitor creatinine. 





Comment: Last EF 45-50%. On 80 mg IV furosemide.


Cards on board, recs appreciated.    





(2) Pneumonia


Code(s): J18.9 - PNEUMONIA, UNSPECIFIED ORGANISM   Status: Acute   


Qualifiers: 


   Pneumonia type: due to unspecified organism   Laterality: right   Lung 

location: lower lobe of lung   Qualified Code(s): J18.1 - Lobar pneumonia, 

unspecified organism   


Plan: 


Continue Levofloxacin. 





Comment: Improving with Levofloxacin   





(3) HTN (hypertension)


Code(s): I10 - ESSENTIAL (PRIMARY) HYPERTENSION   Status: Chronic   


Qualifiers: 


   Hypertension type: essential hypertension   Qualified Code(s): I10 - 

Essential (primary) hypertension   


Plan: 


Fairly well control. Continue home regimen. 





Comment: Stable currently, continue home BP regimen.   





(4) COPD (chronic obstructive pulmonary disease)


Status: Chronic   


Qualifiers: 


   COPD type: unspecified COPD   Qualified Code(s): J44.9 - Chronic obstructive 

pulmonary disease, unspecified   


Plan: 


Continue nebs, steroids. 





Comment: On O2 (uses 2.5 L at home), nebs, resp therapy. 


Started on IV streoids. Will transition to PO on discharge. 





   





(5) MARIUM (acute kidney injury)


Code(s): N17.9 - ACUTE KIDNEY FAILURE, UNSPECIFIED   Status: Acute   Comment: 

Likely cardiorenal. Monitor. 


   





(6) CKD (chronic kidney disease) stage 3, GFR 30-59 ml/min


Code(s): N18.3 - CHRONIC KIDNEY DISEASE, STAGE 3 (MODERATE)   Status: Chronic   

Comment: See under MARIUM.


   





- Plan


cont current plan of care, continue antibiotics, PT/OT, respiratory therapy, 

DVT proph w/heparin





* .

## 2018-01-26 NOTE — PRG
DATE OF SERVICE:  01/26/2018

 

SUBJECTIVE:  Mr. Tomas is feeling better today.  No complaints.

 

OBJECTIVE:

VITAL SIGNS:  His blood pressure earlier was 119/66 and 157/86, pulse is 90 and irregular.

LUNGS:  Clear.

CARDIAC:  Irregular, irregular.

ABDOMEN:  Soft, nontender.

EXTREMITIES:  No edema.

 

ASSESSMENT:

1.  Atrial fibrillation, chronic.

2.  Congestive heart failure, systolic and diastolic mixed, last ejection fraction 25% to 30%.

3.  Renal function worsened, diuretics were reduced yesterday.

4.  Chronic obstructive pulmonary disease.

 

PLAN:

1.  Resume lisinopril tomorrow at reduced dose, had to cut dose down due to renal insufficiency.

2.  Torsemide doses have been restarted.  We will need to have a discussion about defibrillator impla
ntation prophylactically to see if the patient wishes to have that done.

## 2018-01-26 NOTE — PRG
DATE OF SERVICE:  01/26/2018

 

SUBJECTIVE:  Mr. Tomas says he feels better.

 

OBJECTIVE:

GENERAL:  He is in no distress.

VITAL SIGNS:  He is afebrile, heart rate is 58, respiratory rate 18, oximetry is 98 on 3 liters, bloo
d pressure 151/88.

LUNGS:  Remarkable for improved wheezes.

HEART:  Regular rhythm.

ABDOMEN:  Soft.

 

IMPRESSION:

1.  Chronic obstructive pulmonary disease exacerbation.

2.  Congestive heart failure.

3.  Deconditioning.

 

PLAN:  He needs to be out of bed and in a chair with each meal, there is no chair in his room for sit
ting, so I will enter this order.  I would continue current measures with physical therapy and respir
atory care including IV steroids, nebulized treatments.  We will continue to follow.

## 2018-01-27 NOTE — PDOC.PN
- Subjective


Encounter Start Date: 01/27/18


Encounter Start Time: 12:28


Subjective: No new complaints. Constipation has resolved. No acute events 

overnight 





- Objective


Resuscitation Status: 


 











Resuscitation Status           FULL:Full Resuscitation














MAR Reviewed: Yes


Vital Signs & Weight: 


 Vital Signs (12 hours)











  Temp Pulse Resp BP Pulse Ox


 


 01/27/18 12:26   80  18  


 


 01/27/18 11:24  97 F L  81  16  131/83  93 L


 


 01/27/18 08:54   89  20  


 


 01/27/18 08:17      90 L


 


 01/27/18 08:12   99   


 


 01/27/18 07:35  98 F  99  16   96


 


 01/27/18 07:28  98 F  99  16  138/64  96


 


 01/27/18 04:00  98.0 F  92  20  156/70 H  97


 


 01/27/18 02:28   103 H  16   95








 Weight











Weight                         188 lb 6.4 oz














I&O: 


 











 01/26/18 01/27/18 01/28/18





 06:59 06:59 06:59


 


Intake Total 930 1450 


 


Output Total 950 2765 


 


Balance -20 -1315 











Result Diagrams: 


 01/27/18 04:53





 01/27/18 04:53





Phys Exam





- Physical Examination


HEENT: PERRLA, moist MMs, sclera anicteric


Neck: no JVD, supple, full ROM


Respiratory: no wheezing


Minimal crackles b/l bases


Cardiovascular: RRR, no significant murmur, no rub


Gastrointestinal: soft, non-tender, no distention


Musculoskeletal: no edema, pulses present


Neurological: non-focal, moves all 4 limbs


Psychiatric: normal affect, A&O x 3


Skin: no rash, normal turgor





Dx/Plan


(1) Acute exacerbation of CHF (congestive heart failure)


Code(s): I50.9 - HEART FAILURE, UNSPECIFIED   Status: Acute   


Qualifiers: 


   Congestive heart failure type: systolic   Qualified Code(s): I50.23 - Acute 

on chronic systolic (congestive) heart failure   


Plan: 


Continue current management.





Comment: Last EF 25-30%. 


Lasix discontinued and now on his home PO diuretic regimen. 


Lisinopril started 1/26.


Defibrillator placement discussed by cards. Will see patient in clinic. 


Cards on board, recs appreciated.    





(2) Pneumonia


Code(s): J18.9 - PNEUMONIA, UNSPECIFIED ORGANISM   Status: Acute   


Qualifiers: 


   Pneumonia type: due to unspecified organism   Laterality: right   Lung 

location: lower lobe of lung   Qualified Code(s): J18.1 - Lobar pneumonia, 

unspecified organism   


Comment: Community acquired PNA> 


Improving with Levofloxacin   





(3) HTN (hypertension)


Code(s): I10 - ESSENTIAL (PRIMARY) HYPERTENSION   Status: Chronic   


Qualifiers: 


   Hypertension type: essential hypertension   Qualified Code(s): I10 - 

Essential (primary) hypertension   


Plan: 


Fairly well controlled. Continue home regimen


Low dose lisinopril started





Comment: Stable.


Fairly well controlled. Continue home regimen


Low dose lisinopril started.


   





(4) COPD (chronic obstructive pulmonary disease)


Status: Chronic   


Qualifiers: 


   COPD type: unspecified COPD   Qualified Code(s): J44.9 - Chronic obstructive 

pulmonary disease, unspecified   


Comment: On O2 (uses 2.5 L at home), nebs, resp therapy. 


Started on IV streoids. Will transition to PO on discharge. 





   





(5) MARIUM (acute kidney injury)


Code(s): N17.9 - ACUTE KIDNEY FAILURE, UNSPECIFIED   Status: Acute   


Plan: 


Improving





Comment: Likely cardiorenal. Monitor. 


   





(6) CKD (chronic kidney disease) stage 3, GFR 30-59 ml/min


Code(s): N18.3 - CHRONIC KIDNEY DISEASE, STAGE 3 (MODERATE)   Status: Chronic   

Comment: See under MARIUM.


   





- Plan


cont current plan of care, plan discussed w/ family, continue antibiotics, PT/OT


Home with home health and PT/OT





* .

## 2018-01-27 NOTE — PRG
DATE OF SERVICE:  01/27/2018

 

SUBJECTIVE:  The patient is complaining of weakness, so that he cannot get around.  He is very afraid
 to go home and says that his family does not want him to go home at this time.

 

PHYSICAL EXAMINATION:

VITAL SIGNS:  His temperature is 97.7, pulse 81, respirations 16, O2 sat 93% on 3 liters, blood press
ure 131/83.

HEENT:  Unremarkable.

NECK:  No JVD.

LUNGS:  Diminished but clear breath sounds.

CARDIAC:  S1 and S2 regular.

ABDOMEN:  Soft.

EXTREMITIES:  No edema.

 

LABORATORY DATA:  White blood cell count 12.4, hematocrit 43.6, and platelet count 183.  Sodium 137, 
potassium 3.5, chloride 92, CO2 36, BUN 46, creatinine 1.4, and glucose 145.

 

ASSESSMENT:

1.  Chronic obstructive pulmonary disease.

2.  Severe deconditioning.

 

PLAN:  Family is trying to arrange for nursing care at home.  I think that his discharge should proba
migue predicated on availability of health at his house.  He has been changed over to oral steroids and
 he should be tapered over a couple of weeks.  He can follow up with Dr. Cevallos in a couple of weeks a
s an outpatient.

## 2018-01-28 NOTE — PDOC.PN
- Subjective


Encounter Start Date: 01/28/18


Encounter Start Time: 11:47


Subjective: No new complaints. 


-: No acute events overnight. 





- Objective


Resuscitation Status: 


 











Resuscitation Status           FULL:Full Resuscitation














MAR Reviewed: Yes


Vital Signs & Weight: 


 Vital Signs (12 hours)











  Temp Pulse Resp BP BP Pulse Ox


 


 01/28/18 11:12   96  16   


 


 01/28/18 09:12   65   147/68 H  


 


 01/28/18 08:00  98 F  65  16   147/68 H  93 L


 


 01/28/18 07:54   89  16   


 


 01/28/18 03:52  97.2 F L  102 H  21 H   138/69  93 L


 


 01/28/18 02:17   101 H  16    96


 


 01/28/18 00:00    20   








 Weight











Weight                         199 lb 1.6 oz














I&O: 


 











 01/27/18 01/28/18 01/29/18





 06:59 06:59 06:59


 


Intake Total 1450 1080 


 


Output Total 2765 1560 


 


Balance -1315 480 











Result Diagrams: 


 01/28/18 05:19





 01/28/18 05:19





Phys Exam





- Physical Examination


Constitutional: NAD


HEENT: PERRLA, moist MMs, sclera anicteric


Neck: supple, full ROM


Respiratory: no wheezing, no rales, no rhonchi, clear to auscultation bilateral


Cardiovascular: RRR, no significant murmur, no rub


Gastrointestinal: soft, non-tender, no distention, positive bowel sounds


Musculoskeletal: no edema, pulses present


Neurological: non-focal, moves all 4 limbs


Psychiatric: normal affect, A&O x 3


Skin: no rash, normal turgor





Dx/Plan


(1) Acute exacerbation of CHF (congestive heart failure)


Code(s): I50.9 - HEART FAILURE, UNSPECIFIED   Status: Acute   


Qualifiers: 


   Congestive heart failure type: systolic   Qualified Code(s): I50.23 - Acute 

on chronic systolic (congestive) heart failure   


Plan: 


Continue PO diuretics, lisinopril and beta blockers. 





Comment: Last EF 25-30%. 


Lasix discontinued and now on his home PO diuretic regimen. 


Lisinopril started 1/26.


Defibrillator placement discussed by cards. Will see patient in clinic. 


Cards on board, recs appreciated.    





(2) Pneumonia


Code(s): J18.9 - PNEUMONIA, UNSPECIFIED ORGANISM   Status: Acute   


Qualifiers: 


   Pneumonia type: due to unspecified organism   Laterality: right   Lung 

location: lower lobe of lung   Qualified Code(s): J18.1 - Lobar pneumonia, 

unspecified organism   


Plan: 


Continue O2, Levaquin.





Comment: Community acquired PNA 


Improving with Levofloxacin   





(3) HTN (hypertension)


Code(s): I10 - ESSENTIAL (PRIMARY) HYPERTENSION   Status: Chronic   


Qualifiers: 


   Hypertension type: essential hypertension   Qualified Code(s): I10 - 

Essential (primary) hypertension   


Plan: 


Fairly well controlled. Continue mx.





Comment: Stable.


Fairly well controlled. Continue home regimen


Lisinopril started 1/27


   





(4) COPD (chronic obstructive pulmonary disease)


Status: Chronic   


Qualifiers: 


   COPD type: unspecified COPD   Qualified Code(s): J44.9 - Chronic obstructive 

pulmonary disease, unspecified   


Comment: On O2 (uses 2.5 L at home), nebs, resp therapy. 


Continue PO prednisone.





   





(5) MARIUM (acute kidney injury)


Code(s): N17.9 - ACUTE KIDNEY FAILURE, UNSPECIFIED   Status: Acute   


Plan: 


Had a bump in creatinine, likely from ACEI.


Will monitor; 





Comment: Likely cardiorenal. Monitor. 


   





(6) CKD (chronic kidney disease) stage 3, GFR 30-59 ml/min


Code(s): N18.3 - CHRONIC KIDNEY DISEASE, STAGE 3 (MODERATE)   Status: Chronic   

Comment: See under MARIUM.


   





(7) Atrial fibrillation with rapid ventricular response


Code(s): I48.91 - UNSPECIFIED ATRIAL FIBRILLATION   Status: Chronic   Comment: 

rate controlled.


Continue Eliquis, Carvedilol.    





- Plan


cont current plan of care, plan discussed w/ family, continue antibiotics, PT/OT


Discharge pending home health availability


-: Will f/u with . 





* .

## 2018-01-28 NOTE — PRG
DATE OF SERVICE:  01/28/2018

 

SUBJECTIVE:  He seems to be feeling better.  He is up taking a shower today.

 

OBJECTIVE:

VITAL SIGNS:  Temperature is 97.7, pulse is 80, respirations 18, O2 sat 93% on 3 liters and blood pre
ssure 114/81.

HEENT:  Unremarkable.

NECK:  No JVD.

LUNGS:  Coarse breath sounds.

CARDIAC:  S1 and S2 regular.

ABDOMEN:  Soft.

EXTREMITIES:  No edema.

 

LABORATORY DATA:  White blood cell count 13.6, hematocrit 43.5, BUN 51 and creatinine 1.6.

 

ASSESSMENT:  Chronic obstructive pulmonary disease with exacerbation.

 

PLAN:  Help at home is being arranged.

## 2018-01-29 NOTE — DIS
DATE OF ADMISSION:  01/23/2018.

 

DATE OF DISCHARGE:  01/29/2018.

 

CONDITION AT DISCHARGE:  Stable and improved.

 

DISCHARGE DIAGNOSES:  Acute on chronic systolic and diastolic heart failure, pneumonia.

 

SECONDARY DIAGNOSES:  Hypertension; chronic obstructive pulmonary disease; chronic respiratory failur
e, on supplemental oxygen; acute kidney injury on chronic kidney disease; atrial fibrillation.

 

DISCHARGE MEDICATIONS:  Albuterol sulfate nebulizer 3 mL inhaled q.4 hours, allopurinol 100 mg p.o. d
aily, apixaban 25 mg p.o. b.i.d., aspirin 81 mg p.o. daily, atorvastatin 80 mg p.o. at bedtime, carve
dilol 12.5 mg b.i.d., docusate 100 mg p.o. b.i.d., fluticasone 1 spray each naris, guaifenesin 600 mg
 p.o. q.12 hours, ipratropium bromide 2 sprays each, levofloxacin 250 mg p.o. daily, lisinopril 5 mg 
p.o. daily, meclizine 25 mg p.o. p.r.n., torsemide 100 mg p.o. daily, tiotropium inhaler 18 mcg inhal
ed daily, tamsulosin 0.4 mg p.o. b.i.d., potassium chloride 20 mEq p.o. q.a.m., montelukast sodium 10
 mg p.o. at bedtime, milk of magnesia 30 mL p.o. at bedtime, meclizine 25 mg p.o. p.r.n.

 

HOSPITAL COURSE:  81-year-old male with a history of sleep apnea; paroxysmal atrial fibrillation; 
D; lung cancer, status post right upper and middle lobe lobectomies in 1986; hyperlipidemia; GERD; hy
pertension; CAD; and BPH who presented to the emergency room with shortness of breath for the past 3 
weeks, worse with exertion and increased orthopnea.  He usually is on 2 liters of nasal cannula all t
he time.  He went to Dr. Calle's office on 01/23/2018 and was noted to have chest pain and shortness
 of breath as well as hypoxia, and was sent straight to the emergency room.  At the ER, he received S
lorraine-Medrol, Lasix, aspirin, DuoNebs.  Lab showed CBC, which was normal.  Chemistry was normal, but th
e patient was requiring 4 liters of oxygen to maintain above 96% saturation.  During his admission, h
ayleen was placed on IV furosemide and eventually transitioned to his home torsemide dose.  His worsening 
renal function was due to cardiorenal syndrome and this improved before discharge with diuresis.  His
 in's and out's were monitored.  He was weighed daily and his oxygen saturation returned near to his 
home baseline on 2 liters of oxygen.  He was evaluated by Cardiology and [facility].  Discussion about possible defibrillator implantation was held with the leandra zafar and the patient wishes to have discussion with his cardiologist on an outpatient basis.  Before
 discharge, he had home health arranged for him at home, where he will get therapies and assistance w
ith his medications.

 

CONSULTATIONS:  Cardiology, Pulmonary.

 

DIET:  Heart healthy diet.

 

ACTIVITY:  To resume as tolerated and as directed by physical and occupational therapy.

 

HEALTHCARE GOALS:  He is to follow up with his cardiologist within 1 week of discharge.  He is also i
nstructed to take his medications as prescribed.  He is to return to the emergency room if he develop
s any chest pain, shortness of breath, or any change in his clinical disposition.

 

DISPOSITION:  Home with home health.

 

Total time of discharge including chart review and documentation is 70 minutes.

## 2018-01-29 NOTE — PRG
DATE OF SERVICE:  01/29/2018

 

SUBJECTIVE:  Mr. Tomas said he is feeling better. He has no new complaints

 

OBJECTIVE:

VITAL SIGNS:  He is afebrile, heart rate 72, respiratory rate 16, oximetry is 97
% on 3 liters, blood pressure 157/86.

LUNGS:  His wheezes were improved dramatically.

HEART:  Regular rhythm.

ABDOMEN:  Soft.

 

LABORATORY DATA:  White count 11.5, hemoglobin 14.6, platelets 171.  Sodium 138
, potassium 3.6, chloride 90, bicarbonate 37, BUN 39, creatinine 1.29.

 

IMPRESSION:  Chronic obstructive pulmonary disease exacerbation, mixed with 
diastolic heart failure.  I think he has more asthma than chronic obstructive 
pulmonary disease given that he qualified to have two lobes removed for lung 
cancer in the past.

 

PLAN:  Continue current care.  Hopefully, to be discharged 48-72 hours.

 

CHARLES

## 2018-01-29 NOTE — PDOC.PN
- Subjective


Encounter Start Date: 01/29/18


Encounter Start Time: 10:26


Subjective: No new complaints


-: No acute events overnight





- Objective


Resuscitation Status: 


 











Resuscitation Status           FULL:Full Resuscitation














MAR Reviewed: Yes


Vital Signs & Weight: 


 Vital Signs (12 hours)











  Temp Pulse Resp BP BP BP Pulse Ox


 


 01/29/18 09:41   83   111/61   


 


 01/29/18 07:39  97.5 F L  83  16     94 L


 


 01/29/18 07:37  97.5 F L  83  16   111/61   94 L


 


 01/29/18 07:35   103 H  20    


 


 01/29/18 04:00   108 H     121/72  91 L


 


 01/29/18 00:15   85  18     94 L


 


 01/28/18 22:37   87  16     94 L








 Weight











Weight                         199 lb














I&O: 


 











 01/28/18 01/29/18 01/30/18





 06:59 06:59 06:59


 


Intake Total 1080 1700 


 


Output Total 1560 1670 


 


Balance -480 30 











Result Diagrams: 


 01/29/18 04:33





 01/29/18 04:33





Phys Exam





- Physical Examination


Constitutional: NAD


HEENT: PERRLA, moist MMs, sclera anicteric


Neck: supple


Respiratory: no wheezing, no rales, no rhonchi, clear to auscultation bilateral


Cardiovascular: no significant murmur, no rub


irregularly irregular


Gastrointestinal: soft, non-tender, no distention, positive bowel sounds


Musculoskeletal: no edema, pulses present


Neurological: non-focal, moves all 4 limbs


Psychiatric: normal affect, A&O x 3


Skin: no rash, normal turgor





Dx/Plan


(1) Acute exacerbation of CHF (congestive heart failure)


Code(s): I50.9 - HEART FAILURE, UNSPECIFIED   Status: Acute   


Qualifiers: 


   Congestive heart failure type: systolic   Qualified Code(s): I50.23 - Acute 

on chronic systolic (congestive) heart failure   


Plan: 


Improved tremendously. Continue current management. 





Comment: Last EF 25-30%. 


Lasix discontinued and now on his home PO diuretic regimen. 


Lisinopril started 1/26.


Defibrillator placement discussed by cards. Will see patient in clinic. 


Cards on board, recs appreciated.    





(2) Pneumonia


Code(s): J18.9 - PNEUMONIA, UNSPECIFIED ORGANISM   Status: Acute   


Qualifiers: 


   Pneumonia type: due to unspecified organism   Laterality: right   Lung 

location: lower lobe of lung   Qualified Code(s): J18.1 - Lobar pneumonia, 

unspecified organism   


Plan: 


Improving. 





Comment: Community acquired PNA 


Improving with Levofloxacin   





(3) HTN (hypertension)


Code(s): I10 - ESSENTIAL (PRIMARY) HYPERTENSION   Status: Chronic   


Qualifiers: 


   Hypertension type: essential hypertension   Qualified Code(s): I10 - 

Essential (primary) hypertension   


Plan: 


At goal. 





Comment: Stable.


Fairly well controlled. Continue home regimen


Lisinopril started 1/27


   





(4) COPD (chronic obstructive pulmonary disease)


Status: Chronic   


Qualifiers: 


   COPD type: unspecified COPD   Qualified Code(s): J44.9 - Chronic obstructive 

pulmonary disease, unspecified   


Comment: On O2 (uses 2.5 L at home), nebs, resp therapy. 


Continue PO prednisone.





   





(5) MARIUM (acute kidney injury)


Code(s): N17.9 - ACUTE KIDNEY FAILURE, UNSPECIFIED   Status: Acute   


Plan: 


Improving.





Comment: Likely cardiorenal. Monitor. 


   





(6) CKD (chronic kidney disease) stage 3, GFR 30-59 ml/min


Code(s): N18.3 - CHRONIC KIDNEY DISEASE, STAGE 3 (MODERATE)   Status: Chronic   

Comment: See under MARIUM.


   





(7) Atrial fibrillation with rapid ventricular response


Code(s): I48.91 - UNSPECIFIED ATRIAL FIBRILLATION   Status: Chronic   Comment: 

rate controlled.


Continue Eliquis, Carvedilol.    





- Plan


cont current plan of care, plan discussed w/ family, continue antibiotics, PT/OT

, 





* .

## 2018-02-03 NOTE — EKG
Test Reason : SOB

Blood Pressure : ***/*** mmHG

Vent. Rate : 108 BPM     Atrial Rate : 108 BPM

   P-R Int : 000 ms          QRS Dur : 104 ms

    QT Int : 334 ms       P-R-T Axes : 000 023 -05 degrees

   QTc Int : 447 ms

 

Undetermined rhythm

Incomplete right bundle branch block

Nonspecific ST and T wave abnormality

Abnormal ECG

 

Confirmed by BRYSON GREENE ([id number]),  FANNY RAMIREZ ([id number]) on 2/3/2018 1:42:20 PM

 

Referred By:             Confirmed By:BRYSON GREENE

## 2018-02-03 NOTE — EKG
Test Reason : 

Blood Pressure : ***/*** mmHG

Vent. Rate : 096 BPM     Atrial Rate : 111 BPM

   P-R Int : 000 ms          QRS Dur : 102 ms

    QT Int : 350 ms       P-R-T Axes : 000 013 -25 degrees

   QTc Int : 442 ms

 

Atrial fibrillation with premature ventricular or aberrantly conducted complexes

Incomplete right bundle branch block

Septal infarct , age undetermined

Abnormal ECG

 

Confirmed by ANDREW NOEL, MARLENE ([id number]),  FANNY RAMIREZ ([id number]) on 2/3/2018 2:13:33 PM

 

Referred By:             Confirmed By:MARLENE MORALES MD